# Patient Record
Sex: MALE | Race: OTHER | HISPANIC OR LATINO | URBAN - METROPOLITAN AREA
[De-identification: names, ages, dates, MRNs, and addresses within clinical notes are randomized per-mention and may not be internally consistent; named-entity substitution may affect disease eponyms.]

---

## 2023-04-05 ENCOUNTER — EMERGENCY (EMERGENCY)
Facility: HOSPITAL | Age: 33
LOS: 1 days | Discharge: HOME | End: 2023-04-05
Attending: EMERGENCY MEDICINE
Payer: SELF-PAY

## 2023-04-05 VITALS
HEART RATE: 122 BPM | RESPIRATION RATE: 18 BRPM | WEIGHT: 214.95 LBS | TEMPERATURE: 98 F | HEIGHT: 66 IN | SYSTOLIC BLOOD PRESSURE: 187 MMHG | OXYGEN SATURATION: 95 % | DIASTOLIC BLOOD PRESSURE: 100 MMHG

## 2023-04-05 VITALS
RESPIRATION RATE: 19 BRPM | DIASTOLIC BLOOD PRESSURE: 72 MMHG | HEART RATE: 89 BPM | SYSTOLIC BLOOD PRESSURE: 160 MMHG | OXYGEN SATURATION: 97 %

## 2023-04-05 PROCEDURE — 99285 EMERGENCY DEPT VISIT HI MDM: CPT

## 2023-04-05 PROCEDURE — 99284 EMERGENCY DEPT VISIT MOD MDM: CPT

## 2023-04-05 PROCEDURE — 82962 GLUCOSE BLOOD TEST: CPT

## 2023-04-05 RX ORDER — MIDAZOLAM HYDROCHLORIDE 1 MG/ML
4 INJECTION, SOLUTION INTRAMUSCULAR; INTRAVENOUS ONCE
Refills: 0 | Status: COMPLETED | OUTPATIENT
Start: 2023-04-05 | End: 2023-04-05

## 2023-04-05 NOTE — ED ADULT NURSE NOTE - NSFALLRSKASSISTTYPE_ED_ALL_ED
Standing/Walking
Implemented All Universal Safety Interventions:  [place] to call system. Call bell, personal items and telephone within reach. Instruct patient to call for assistance. Room bathroom lighting operational. Non-slip footwear when patient is off stretcher. Physically safe environment: no spills, clutter or unnecessary equipment. Stretcher in lowest position, wheels locked, appropriate side rails in place.

## 2023-04-05 NOTE — ED PROVIDER NOTE - CLINICAL SUMMARY MEDICAL DECISION MAKING FREE TEXT BOX
33 y/o M presents for alcohol intoxication, admits to drinking, no outward signs of trauma, no focal neuro deficits, mildly agitated initially, but able to be verbally redirected. Accucheck, fall risk, will monitor for sobriety.

## 2023-04-05 NOTE — ED PROVIDER NOTE - PHYSICAL EXAMINATION
Gen: Well appearing in NAD, intoxicated.  Head: NC/AT  Neck: trachea midline  Resp:  No distress  Ext: no deformities  Neuro:  A&O appears non focal  Skin:  Warm and dry as visualized  Psych:  Tearful, mildly agitated, but able to be verbally redirected.

## 2023-04-05 NOTE — ED PROVIDER NOTE - PATIENT PORTAL LINK FT
You can access the FollowMyHealth Patient Portal offered by [facility] by registering at the following website: [email or web address]. By joining [employer]’s FollowMyHealth portal, you will also be able to view your health information using other applications (apps) compatible with our system.

## 2023-04-05 NOTE — ED ADULT NURSE NOTE - NSIMPLEMENTINTERV_GEN_ALL_ED
Implemented All Fall Risk Interventions:  [place] to call system. Call bell, personal items and telephone within reach. Instruct patient to call for assistance. Room bathroom lighting operational. Non-slip footwear when patient is off stretcher. Physically safe environment: no spills, clutter or unnecessary equipment. Stretcher in lowest position, wheels locked, appropriate side rails in place. Provide visual cue, wrist band, yellow gown, etc. Monitor gait and stability. Monitor for mental status changes and reorient to person, place, and time. Review medications for side effects contributing to fall risk. Reinforce activity limits and safety measures with patient and family.

## 2023-04-05 NOTE — ED PROVIDER NOTE - OBJECTIVE STATEMENT
33 y/o M presents BIBEMS for intoxication. Per EMS, wife called EMS after patient drank 1 pint of vodka and was very animated and tearful, something that has never happened before. Patient admits to drinking a little today. He denies any complaints, denies SI/HI/AVH. He is cooperative, constantly repeating, "my son is my life." Further history difficult to obtain due to patient's intoxicated state.

## 2023-04-05 NOTE — ED PROVIDER NOTE - PROGRESS NOTE DETAILS
[place]: Patient agitated, pulling all curtains around him, keeps trying to get up, family at bedside unable to calm him down. 4 mg IM versed ordered for agitation. Hector: Sister-in-law and niece at bedside now requesting to speak with me. I spoke with them and patient with  Pramod. Patient is now awake, sitting up, speaking, still intoxicated, but able to converse and ambulate. He is not sure why  he is here. I explained to family that I can release him in their care and they are agreeable.

## 2023-04-05 NOTE — ED PROVIDER NOTE - NSFOLLOWUPINSTRUCTIONS_ED_ALL_ED_FT
Intoxicación alcohólica  Alcohol Intoxication  La intoxicación alcohólica ocurre cuando jose luis persona ya no piensa con claridad ni se desempeña awais (experimenta un deterioro) después de consumir bebidas alcohólicas. La intoxicación alcohólica puede ocurrir tan solo con jose luis copa. El definición legal de la intoxicación alcohólica depende de la cantidad de alcohol en la servando (concentración de alcohol en la servando, DELFINA). Jose Luis DELFINA de 80 a 100 mg/dl o mayor se considera legalmente paul jose luis intoxicación alcohólica. El nivel de deterioro depende de lo siguiente:  La cantidad de alcohol que la persona bebió.  La edad, el sexo y el peso de la persona.  La frecuencia con la que la persona luther.  Si la persona tiene otras enfermedades, tales paul diabetes, convulsiones o jose luis afección cardíaca.  La intoxicación alcohólica puede variar de leve a grave. La afección puede ser peligrosa, especialmente si la persona:  También usa ciertas drogas ilegales y medicamentos recetados.  Luther jose luis gran cantidad de alcohol en un periodo corto de tiempo (consume alcohol compulsivamente).  En las mujeres, el consumo de alcohol compulsivo significa liborio cuatro o más medidas de alcohol a la vez.  En los hombres, el consumo de alcohol compulsivo significa beber mark o más medidas de alcohol a la vez.  Si usted o alguien a finney alrededor parece estar embriagado, diga algo y actúe.    ¿Cuáles son las causas?  La causa de esta afección es el consumo de alcohol.    ¿Qué incrementa el riesgo?  Los siguientes factores pueden hacer que sea más propenso a contraer esta afección:  Presión de los pares en los adultos jóvenes.  Dificultad para manejar el estrés.  Antecedentes de consumo excesivo de drogas o alcohol.  Combinación de alcohol con drogas.  Antecedentes familiares de consumo excesivo de alcohol o drogas.  Peso corporal bajo.  Consumo de alcohol compulsivo.  ¿Cuáles son los signos o síntomas?  Los síntomas de la intoxicación alcohólica pueden variar de jose luis persona a otra. Los síntomas pueden ser leves, moderados o graves.    Los síntomas de jose luis intoxicación alcohólica leve pueden incluir los siguientes:  Sensación de relajación o somnolencia.  Tener jose luis leve dificultad con la coordinación, el habla, la memoria o la atención.  Los síntomas de jose luis intoxicación alcohólica moderada pueden incluir los siguientes:  Lila adrien o tristeza intensa.  Tener dificultad con la coordinación, el habla, la memoria o la atención.  Los síntomas de jose luis intoxicación alcohólica grave pueden incluir los siguientes:  Tener jose luis seria dificultad con la coordinación, el habla, la memoria o la atención.  Desmayo.  Vómitos.  Confusión.  Respiración lenta.  Coma.  La intoxicación alcohólica puede cambiar rápidamente de leve a grave. Puede causar coma o la muerte, especialmente en las personas que no están expuestas al alcohol con frecuencia.    ¿Cómo se diagnostica?  El médico le preguntará la cantidad y el tipo de bebida alcohólica que bebió. La intoxicación también puede diagnosticarse en función de:  Los síntomas y los antecedentes médicos.  Un examen físico.  Un análisis de servando que mide la DELFINA.  El olor a alcohol en el aliento.  ¿Cómo se trata?  El tratamiento para la intoxicación por alcohol puede incluir:  Ser controlado en un departamento de emergencias, hospital o centro de tratamiento hasta que la DELFINA disminuya y sea seguro para usted regresar a finney casa.  Líquidos intravenosos (i.v.) para prevenir o tratar la pérdida de líquido en el cuerpo (deshidratación).  Medicamentos para tratar las náuseas o los vómitos, o para eliminar el alcohol del cuerpo.  Asesoramiento (intervención breve) sobre los peligros de consumir alcohol.  Tratamiento para el trastorno por el consumo de sustancias.  Oxigenoterapia o jose luis máquina para respirar (respirador).  La exposición a андрей plazo (crónica) al alcohol puede tener efectos a андрей plazo en el cerebro, el corazón y el sistema digestivo. Estos efectos pueden ser graves y pueden requerir tratamiento.    Siga estas instrucciones en finney casa:  A sign showing that a person should not drive.  Comida y bebida    A 12-ounce bottle of beer, a 5-ounce glass of wine, and a 1.5-ounce shot of hard liquor.  Three cups showing dark yellow, yellow, and pale yellow urine.  No juana alcohol si:  Finney médico le indica no hacerlo.  Está embarazada, puede estar embarazada o está tratando de quedar embarazada.  No tiene la edad legal para beber (mayor de 21 años de edad en los [place]).  Está tomando medicamentos que no se deben liborio con alcohol.  Tiene jose luis afección médica y el alcohol la empeora.  Tiene que conducir o realizar actividades que requieren que esté alerta.  Tiene un trastorno por abuso de sustancias.  Pregúntele al médico si el alcohol es seguro para usted. Si el médico le permite beber alcohol, limite la cantidad que kiera. Puede beber:  De 0 a 1 medida por día para las mujeres.  De 0 a 2 medidas por día para los hombres.  Esté atento a la cantidad de alcohol que hay en las bebidas que kiera. En los [place], jose luis medida equivale a jose luis botella de cerveza de 12 oz (355 ml), un vaso de vino de 5 oz (148 ml) o un vaso de jose luis bebida alcohólica de osmin graduación de 1½ oz (44 ml).  Evite beber alcohol con el estómago vacío.  Manténgase hidratado. [place] suficiente líquido paul para mantener la orina de color amarillo pálido. Evite la cafeína, ya que puede deshidratarlo.  Evite consumir más de jose luis bebida alcohólica por hora.  Cuando juana más que jose luis medida de alcohol, juana agua o jose luis bebida sin alcohol entre las bebidas alcohólicas.  Instrucciones generales    Use los medicamentos de venta martin y los recetados solamente paul se lo haya indicado el médico.  No conduzca después de beber cualquier cantidad de alcohol. Organice un conductor designado u otra forma de volver a casa.  Pídale a alguna persona responsable que se quede con usted mientras está embriagado. No debería quedarse solo.  Concurra a todas las visitas de seguimiento paul se lo haya indicado el médico. [place] es importante.  Comuníquese con un médico si:  No mejora luego de algunos días.  Tiene problemas en el trabajo, la escuela o el hogar debido al consumo de alcohol.  Solicite ayuda inmediatamente si:  Tiene alguno de los siguientes síntomas:  Grave dificultad con la coordinación, el habla, la memoria o la atención.  Dificultad para mantenerse despierto.  Confusión grave.  Jose Luis convulsión.  Desvanecimiento.  Desmayos.  Vómitos con servando de color love brillante o jose luis sustancia parecida a la borra del café.  Tiene servando en la materia fecal (heces). La servando puede hacer que las heces tengan color love brillante, color manoj o aspecto alquitranado. También pueden tener mal olor.  Temblores al tratar de abandonar el hábito de beber.  Pensamientos acerca de lastimarse a sí mismo o a otras personas.  Si alguna vez siente que puede lastimarse o lastimar a otras personas, o tiene pensamientos de poner fin a finney mahendra, busque ayuda de inmediato. Puede dirigirse al servicio de emergencias más cercano o comunicarse con:  Servicio de emergencias de finney localidad (911 en [place]  Jose Luis línea de ayuda para crisis suicidas, paul la National Suicide Prevention Lifeline (Línea [employer] Nacional para la Prevención del Suicidio) al 1-677.641.4104 o al 988 en los [place]. Esta línea atiende las 24 horas del día.  Resumen  La intoxicación alcohólica ocurre cuando jose luis persona ya no piensa con claridad ni se desempeña awais después de consumir bebidas alcohólicas.  Si el médico le dice que no corre riesgos al beber alcohol, limite finney consumo a no más de 1 medida al día si es magdalena (no juana si está embarazada) y 2 medidas si es hombre. Jose Luis medida equivale a 12 onzas de cerveza, 5 onzas de vino o 1½ onzas de bebidas alcohólicas de osmin graduación.  Comuníquese con el médico si el consumo de alcohol le ha causado problemas en el trabajo, en la escuela o en finney casa.  Busque ayuda de inmediato si tiene pensamientos acerca de lastimarse a usted mismo o a otras personas.  Esta información no tiene paul fin reemplazar el consejo del médico. Asegúrese de hacerle al médico cualquier pregunta que tenga.

## 2023-04-05 NOTE — ED ADULT TRIAGE NOTE - CHIEF COMPLAINT QUOTE
Pt BIBA from home for positive ETOH. As per EMS wife states pt drank a pint of vodka today and was acting erratic.  Security and MD called to bedside and pt calmed down after verbal de-escalation.  Pt changed into yellow gown, belongings secured.  Denies SI/HI, drug use.

## 2023-04-05 NOTE — ED ADULT NURSE NOTE - OBJECTIVE STATEMENT
Pt presenting to Emergency Department BIBA due to ETOH use. Pt found in car drinking pint of vodka. Aox4. Pt placed in yellow gown. Respirations even and unlabored. Skin warm to touch. pt educated on plan of care, pt able to successfully teach back plan of care to RN. pt educated on plan of care, pt able to successfully teach back plan of care to RN. Pt presenting to Emergency Department BIBA due to ETOH use. Pt found in car drinking pint of vodka. Aox4. Pt placed in yellow gown. Respirations even and unlabored. Skin warm to touch.

## 2023-07-24 ENCOUNTER — EMERGENCY (EMERGENCY)
Facility: HOSPITAL | Age: 33
LOS: 1 days | Discharge: HOME | End: 2023-07-24
Attending: STUDENT IN AN ORGANIZED HEALTH CARE EDUCATION/TRAINING PROGRAM
Payer: COMMERCIAL

## 2023-07-24 VITALS
OXYGEN SATURATION: 95 % | HEART RATE: 84 BPM | DIASTOLIC BLOOD PRESSURE: 114 MMHG | WEIGHT: 190.04 LBS | TEMPERATURE: 99 F | SYSTOLIC BLOOD PRESSURE: 209 MMHG | RESPIRATION RATE: 16 BRPM

## 2023-07-24 LAB
ALBUMIN SERPL ELPH-MCNC: 4.4 G/DL — SIGNIFICANT CHANGE UP (ref 3.3–5.2)
ALP SERPL-CCNC: 60 U/L — SIGNIFICANT CHANGE UP (ref 40–120)
ALT FLD-CCNC: 72 U/L — HIGH
ANION GAP SERPL CALC-SCNC: 13 MMOL/L — SIGNIFICANT CHANGE UP (ref 5–17)
APPEARANCE UR: CLEAR — SIGNIFICANT CHANGE UP
AST SERPL-CCNC: 34 U/L — SIGNIFICANT CHANGE UP
BACTERIA # UR AUTO: ABNORMAL
BASOPHILS # BLD AUTO: 0.05 K/UL — SIGNIFICANT CHANGE UP (ref 0–0.2)
BASOPHILS NFR BLD AUTO: 0.6 % — SIGNIFICANT CHANGE UP (ref 0–2)
BILIRUB SERPL-MCNC: 0.2 MG/DL — LOW (ref 0.4–2)
BILIRUB UR-MCNC: NEGATIVE — SIGNIFICANT CHANGE UP
BUN SERPL-MCNC: 15.1 MG/DL — SIGNIFICANT CHANGE UP (ref 8–20)
CALCIUM SERPL-MCNC: 8.6 MG/DL — SIGNIFICANT CHANGE UP (ref 8.4–10.5)
CHLORIDE SERPL-SCNC: 103 MMOL/L — SIGNIFICANT CHANGE UP (ref 96–108)
CO2 SERPL-SCNC: 24 MMOL/L — SIGNIFICANT CHANGE UP (ref 22–29)
COLOR SPEC: YELLOW — SIGNIFICANT CHANGE UP
CREAT SERPL-MCNC: 0.73 MG/DL — SIGNIFICANT CHANGE UP (ref 0.5–1.3)
DIFF PNL FLD: ABNORMAL
EGFR: 123 ML/MIN/1.73M2 — SIGNIFICANT CHANGE UP
EOSINOPHIL # BLD AUTO: 0.27 K/UL — SIGNIFICANT CHANGE UP (ref 0–0.5)
EOSINOPHIL NFR BLD AUTO: 3 % — SIGNIFICANT CHANGE UP (ref 0–6)
EPI CELLS # UR: SIGNIFICANT CHANGE UP
GLUCOSE SERPL-MCNC: 92 MG/DL — SIGNIFICANT CHANGE UP (ref 70–99)
GLUCOSE UR QL: NEGATIVE MG/DL — SIGNIFICANT CHANGE UP
HCT VFR BLD CALC: 43 % — SIGNIFICANT CHANGE UP (ref 39–50)
HGB BLD-MCNC: 14.2 G/DL — SIGNIFICANT CHANGE UP (ref 13–17)
IMM GRANULOCYTES NFR BLD AUTO: 0.6 % — SIGNIFICANT CHANGE UP (ref 0–0.9)
KETONES UR-MCNC: NEGATIVE — SIGNIFICANT CHANGE UP
LEUKOCYTE ESTERASE UR-ACNC: NEGATIVE — SIGNIFICANT CHANGE UP
LIDOCAIN IGE QN: 35 U/L — SIGNIFICANT CHANGE UP (ref 22–51)
LYMPHOCYTES # BLD AUTO: 2.53 K/UL — SIGNIFICANT CHANGE UP (ref 1–3.3)
LYMPHOCYTES # BLD AUTO: 28.2 % — SIGNIFICANT CHANGE UP (ref 13–44)
MAGNESIUM SERPL-MCNC: 2.2 MG/DL — SIGNIFICANT CHANGE UP (ref 1.6–2.6)
MCHC RBC-ENTMCNC: 28.7 PG — SIGNIFICANT CHANGE UP (ref 27–34)
MCHC RBC-ENTMCNC: 33 GM/DL — SIGNIFICANT CHANGE UP (ref 32–36)
MCV RBC AUTO: 87 FL — SIGNIFICANT CHANGE UP (ref 80–100)
MONOCYTES # BLD AUTO: 1.23 K/UL — HIGH (ref 0–0.9)
MONOCYTES NFR BLD AUTO: 13.7 % — SIGNIFICANT CHANGE UP (ref 2–14)
NEUTROPHILS # BLD AUTO: 4.85 K/UL — SIGNIFICANT CHANGE UP (ref 1.8–7.4)
NEUTROPHILS NFR BLD AUTO: 53.9 % — SIGNIFICANT CHANGE UP (ref 43–77)
NITRITE UR-MCNC: NEGATIVE — SIGNIFICANT CHANGE UP
PH UR: 6.5 — SIGNIFICANT CHANGE UP (ref 5–8)
PLATELET # BLD AUTO: 309 K/UL — SIGNIFICANT CHANGE UP (ref 150–400)
POTASSIUM SERPL-MCNC: 3.7 MMOL/L — SIGNIFICANT CHANGE UP (ref 3.5–5.3)
POTASSIUM SERPL-SCNC: 3.7 MMOL/L — SIGNIFICANT CHANGE UP (ref 3.5–5.3)
PROT SERPL-MCNC: 6.9 G/DL — SIGNIFICANT CHANGE UP (ref 6.6–8.7)
PROT UR-MCNC: 30 MG/DL
RBC # BLD: 4.94 M/UL — SIGNIFICANT CHANGE UP (ref 4.2–5.8)
RBC # FLD: 13.1 % — SIGNIFICANT CHANGE UP (ref 10.3–14.5)
RBC CASTS # UR COMP ASSIST: ABNORMAL /HPF (ref 0–4)
SODIUM SERPL-SCNC: 140 MMOL/L — SIGNIFICANT CHANGE UP (ref 135–145)
SP GR SPEC: 1.01 — SIGNIFICANT CHANGE UP (ref 1.01–1.02)
UROBILINOGEN FLD QL: 1 MG/DL
WBC # BLD: 8.98 K/UL — SIGNIFICANT CHANGE UP (ref 3.8–10.5)
WBC # FLD AUTO: 8.98 K/UL — SIGNIFICANT CHANGE UP (ref 3.8–10.5)
WBC UR QL: SIGNIFICANT CHANGE UP /HPF (ref 0–5)

## 2023-07-24 PROCEDURE — 93010 ELECTROCARDIOGRAM REPORT: CPT

## 2023-07-24 PROCEDURE — 99285 EMERGENCY DEPT VISIT HI MDM: CPT

## 2023-07-24 RX ORDER — MORPHINE SULFATE 50 MG/1
4 CAPSULE, EXTENDED RELEASE ORAL ONCE
Refills: 0 | Status: DISCONTINUED | OUTPATIENT
Start: 2023-07-24 | End: 2023-07-24

## 2023-07-24 RX ORDER — FAMOTIDINE 10 MG/ML
20 INJECTION INTRAVENOUS ONCE
Refills: 0 | Status: COMPLETED | OUTPATIENT
Start: 2023-07-24 | End: 2023-07-24

## 2023-07-24 RX ORDER — SODIUM CHLORIDE 9 MG/ML
1000 INJECTION INTRAMUSCULAR; INTRAVENOUS; SUBCUTANEOUS ONCE
Refills: 0 | Status: COMPLETED | OUTPATIENT
Start: 2023-07-24 | End: 2023-07-24

## 2023-07-24 RX ADMIN — SODIUM CHLORIDE 1000 MILLILITER(S): 9 INJECTION INTRAMUSCULAR; INTRAVENOUS; SUBCUTANEOUS at 23:48

## 2023-07-24 RX ADMIN — FAMOTIDINE 20 MILLIGRAM(S): 10 INJECTION INTRAVENOUS at 22:08

## 2023-07-24 RX ADMIN — MORPHINE SULFATE 4 MILLIGRAM(S): 50 CAPSULE, EXTENDED RELEASE ORAL at 22:08

## 2023-07-24 NOTE — ED ADULT TRIAGE NOTE - CHIEF COMPLAINT QUOTE
Ambulatory to ED c/o abdominal pain x3 weeks and lower back pian x3 days. Patient states he was evaluated at another physicians office w/o relief of symptoms. Patient denies dysuria sx, N/V/D. Patient denies HX HTN.

## 2023-07-25 VITALS
HEART RATE: 74 BPM | TEMPERATURE: 99 F | SYSTOLIC BLOOD PRESSURE: 167 MMHG | RESPIRATION RATE: 16 BRPM | OXYGEN SATURATION: 99 % | DIASTOLIC BLOOD PRESSURE: 109 MMHG

## 2023-07-25 LAB
CULTURE RESULTS: NO GROWTH — SIGNIFICANT CHANGE UP
SPECIMEN SOURCE: SIGNIFICANT CHANGE UP

## 2023-07-25 PROCEDURE — 96375 TX/PRO/DX INJ NEW DRUG ADDON: CPT

## 2023-07-25 PROCEDURE — 99285 EMERGENCY DEPT VISIT HI MDM: CPT | Mod: 25

## 2023-07-25 PROCEDURE — 87086 URINE CULTURE/COLONY COUNT: CPT

## 2023-07-25 PROCEDURE — 83690 ASSAY OF LIPASE: CPT

## 2023-07-25 PROCEDURE — 81001 URINALYSIS AUTO W/SCOPE: CPT

## 2023-07-25 PROCEDURE — T1013: CPT

## 2023-07-25 PROCEDURE — 80053 COMPREHEN METABOLIC PANEL: CPT

## 2023-07-25 PROCEDURE — 93005 ELECTROCARDIOGRAM TRACING: CPT

## 2023-07-25 PROCEDURE — 74177 CT ABD & PELVIS W/CONTRAST: CPT | Mod: ME

## 2023-07-25 PROCEDURE — 83735 ASSAY OF MAGNESIUM: CPT

## 2023-07-25 PROCEDURE — 36415 COLL VENOUS BLD VENIPUNCTURE: CPT

## 2023-07-25 PROCEDURE — G1004: CPT

## 2023-07-25 PROCEDURE — 85025 COMPLETE CBC W/AUTO DIFF WBC: CPT

## 2023-07-25 PROCEDURE — 96374 THER/PROPH/DIAG INJ IV PUSH: CPT

## 2023-07-25 PROCEDURE — 74177 CT ABD & PELVIS W/CONTRAST: CPT | Mod: 26,ME

## 2023-07-25 NOTE — ED PROVIDER NOTE - OBJECTIVE STATEMENT
Patient with 4-5 days of watery stools, seen by his PMD, told it was a diarrheal illness would likely spontaneously resolve, to drink lots of fluids. Pt has been drinking lots of fluids but notes that he is still having the watery stools. No dark or tarry appearance, no BRBPR. Denies fevers, does have occasional chills. No vomiting, occasionally is nauseated especially after he eats solids. Notes he has pain along his left and right abdomen.

## 2023-07-25 NOTE — ED PROVIDER NOTE - CLINICAL SUMMARY MEDICAL DECISION MAKING FREE TEXT BOX
Susana BELTRÁN: patient signed out to me by Dr Akbar. Workup unremarkable. Ready for dc. Return precautions given. COREY Akbar: 33M with diarrheal illness for past 5 days, seen by PMD, worried he has ongoing sx, discussed likely enteritis / colitis, could cont symptomatic treatment, patient would like labs / imaging, will workup follow up studies, reassess, dispo.     Susana BELTRÁN: patient signed out to me by Dr Akbar. Workup unremarkable. Ready for dc. Return precautions given.

## 2023-07-25 NOTE — ED PROVIDER NOTE - PHYSICAL EXAMINATION
Gen: NAD, non-toxic, conversational  Eyes: PERRLA, EOMI   HENT: Normocephalic, atraumatic. External ears normal, no rhinorrhea, tacky mucous membranes.   CV: RRR, no M/R/G  Resp: CTAB, non-labored, speaking without difficulty on room air  Abd: soft, +tenderness along left flank and right flank, o/w non rigid, no guarding or rebound tenderness  Back: No CVAT bilaterally, no midline ttp  Skin: dry, wwp   Neuro: AOx3, speech is fluent and appropriate  Psych: Mood ok, affect euthymic

## 2023-07-25 NOTE — ED PROVIDER NOTE - PATIENT PORTAL LINK FT
You can access the FollowMyHealth Patient Portal offered by [facility] by registering at the following website: [email or web address]. By joining [employer]’s FollowMyHealth portal, you will also be able to view your health information using other applications (apps) compatible with our system.

## 2023-07-25 NOTE — ED ADULT NURSE NOTE - OBJECTIVE STATEMENT
Patient presents to ED c/o abdominal pain for 3 weeks that radiates to lower back for 3 days.  Denies c/p, sob, n/v/d.

## 2023-07-25 NOTE — ED PROVIDER NOTE - NSFOLLOWUPINSTRUCTIONS_ED_ALL_ED_FT
- Follow up with your doctor within 2-3 days.   - Bring results with you to the appointment.   - Stay well hydrated.   - Return to the ED for any new or worsening symptoms.     Abdominal Pain    Many things can cause abdominal pain. Many times, abdominal pain is not caused by a disease and will improve without treatment. Your health care provider will do a physical exam to determine if there is a dangerous cause of your pain; blood tests and imaging may help determine the cause of your pain. However, in many cases, no cause may be found and you may need further testing as an outpatient. Monitor your abdominal pain for any changes.     SEEK IMMEDIATE MEDICAL CARE IF YOU HAVE ANY OF THE FOLLOWING SYMPTOMS: worsening abdominal pain, uncontrollable vomiting, profuse diarrhea, inability to have bowel movements or pass gas, black or bloody stools, fever accompanying chest pain or back pain, or fainting. These symptoms may represent a serious problem that is an emergency. Do not wait to see if the symptoms will go away. Get medical help right away. Call 911 and do not drive yourself to the hospital.

## 2023-07-25 NOTE — ED ADULT NURSE NOTE - NSFALLUNIVINTERV_ED_ALL_ED
Bed/Stretcher in lowest position, wheels locked, appropriate side rails in place/Call bell, personal items and telephone in reach/Instruct patient to call for assistance before getting out of bed/chair/stretcher/Non-slip footwear applied when patient is off stretcher/[place] to call system/Physically safe environment - no spills, clutter or unnecessary equipment/Purposeful proactive rounding/Room/bathroom lighting operational, light cord in reach